# Patient Record
Sex: FEMALE | Race: WHITE
[De-identification: names, ages, dates, MRNs, and addresses within clinical notes are randomized per-mention and may not be internally consistent; named-entity substitution may affect disease eponyms.]

---

## 2019-07-22 ENCOUNTER — HOSPITAL ENCOUNTER (OUTPATIENT)
Dept: HOSPITAL 91 - E/R | Age: 79
Setting detail: OBSERVATION
LOS: 1 days | Discharge: HOME | End: 2019-07-23
Payer: COMMERCIAL

## 2019-07-22 ENCOUNTER — HOSPITAL ENCOUNTER (OUTPATIENT)
Dept: HOSPITAL 10 - E/R | Age: 79
Setting detail: OBSERVATION
LOS: 1 days | Discharge: HOME | End: 2019-07-23
Attending: INTERNAL MEDICINE | Admitting: INTERNAL MEDICINE
Payer: COMMERCIAL

## 2019-07-22 VITALS
HEIGHT: 60 IN | WEIGHT: 116.84 LBS | BODY MASS INDEX: 22.94 KG/M2 | BODY MASS INDEX: 22.94 KG/M2 | HEIGHT: 60 IN | WEIGHT: 116.84 LBS

## 2019-07-22 VITALS — DIASTOLIC BLOOD PRESSURE: 65 MMHG | SYSTOLIC BLOOD PRESSURE: 141 MMHG | RESPIRATION RATE: 18 BRPM | HEART RATE: 60 BPM

## 2019-07-22 VITALS — RESPIRATION RATE: 17 BRPM | SYSTOLIC BLOOD PRESSURE: 148 MMHG | DIASTOLIC BLOOD PRESSURE: 72 MMHG | HEART RATE: 64 BPM

## 2019-07-22 VITALS — SYSTOLIC BLOOD PRESSURE: 158 MMHG | DIASTOLIC BLOOD PRESSURE: 68 MMHG | RESPIRATION RATE: 18 BRPM | HEART RATE: 55 BPM

## 2019-07-22 VITALS — DIASTOLIC BLOOD PRESSURE: 67 MMHG | RESPIRATION RATE: 16 BRPM | SYSTOLIC BLOOD PRESSURE: 146 MMHG | HEART RATE: 56 BPM

## 2019-07-22 DIAGNOSIS — R00.2: Primary | ICD-10-CM

## 2019-07-22 DIAGNOSIS — E78.5: ICD-10-CM

## 2019-07-22 DIAGNOSIS — M81.0: ICD-10-CM

## 2019-07-22 DIAGNOSIS — I10: ICD-10-CM

## 2019-07-22 DIAGNOSIS — Z79.84: ICD-10-CM

## 2019-07-22 DIAGNOSIS — E11.9: ICD-10-CM

## 2019-07-22 LAB
ADD MAN DIFF?: NO
ANION GAP: 7 (ref 5–13)
BASOPHIL #: 0 10^3/UL (ref 0–0.1)
BASOPHILS %: 0.2 % (ref 0–2)
BLOOD UREA NITROGEN: 22 MG/DL (ref 7–20)
CALCIUM: 9.6 MG/DL (ref 8.4–10.2)
CARBON DIOXIDE: 27 MMOL/L (ref 21–31)
CHLORIDE: 108 MMOL/L (ref 97–110)
CK INDEX: 0.5
CK INDEX: 0.6
CK-MB: 0.48 NG/ML (ref 0–2.4)
CK-MB: 0.66 NG/ML (ref 0–2.4)
CREATINE KINASE: 102 IU/L (ref 23–200)
CREATINE KINASE: 115 IU/L (ref 23–200)
CREATININE: 0.95 MG/DL (ref 0.44–1)
EOSINOPHILS #: 0 10^3/UL (ref 0–0.5)
EOSINOPHILS %: 0.5 % (ref 0–7)
FREE THYROXINE INDEX (CALC): 3.07 UG/ML (ref 0.65–3.89)
GLUCOSE: 151 MG/DL (ref 70–220)
HEMATOCRIT: 37.7 % (ref 37–47)
HEMOGLOBIN: 12.3 G/DL (ref 12–16)
IMMATURE GRANS #M: 0.01 10^3/UL (ref 0–0.03)
IMMATURE GRANS % (M): 0.2 % (ref 0–0.43)
INR: 0.97
LYMPHOCYTES #: 2.1 10^3/UL (ref 0.8–2.9)
LYMPHOCYTES %: 35.8 % (ref 15–51)
MAGNESIUM: 2.1 MG/DL (ref 1.7–2.5)
MEAN CORPUSCULAR HEMOGLOBIN: 30.9 PG (ref 29–33)
MEAN CORPUSCULAR HGB CONC: 32.6 G/DL (ref 32–37)
MEAN CORPUSCULAR VOLUME: 94.7 FL (ref 82–101)
MEAN PLATELET VOLUME: 12.5 FL (ref 7.4–10.4)
MONOCYTE #: 0.5 10^3/UL (ref 0.3–0.9)
MONOCYTES %: 8.2 % (ref 0–11)
NEUTROPHIL #: 3.2 10^3/UL (ref 1.6–7.5)
NEUTROPHILS %: 55.1 % (ref 39–77)
NUCLEATED RED BLOOD CELLS #: 0 10^3/UL (ref 0–0)
NUCLEATED RED BLOOD CELLS%: 0 /100WBC (ref 0–0)
PARTIAL THROMBOPLASTIN TIME: 26.9 SEC (ref 23–35)
PLATELET COUNT: 115 10^3/UL (ref 140–415)
POTASSIUM: 4.3 MMOL/L (ref 3.5–5.1)
PROTIME: 13 SEC (ref 11.9–14.9)
PT RATIO: 1
RED BLOOD COUNT: 3.98 10^6/UL (ref 4.2–5.4)
RED CELL DISTRIBUTION WIDTH: 12 % (ref 11.5–14.5)
SODIUM: 142 MMOL/L (ref 135–144)
T3 UPTAKE: 29.5 % (ref 23.5–40.5)
T4 (THYROXINE): 10.4 UG/DL (ref 5.5–11)
THYROID STIMULATING HORMONE: 0.81 MIU/L (ref 0.47–4.68)
THYROID STIMULATING HORMONE: 0.85 MIU/L (ref 0.47–4.68)
TROPONIN-I: < 0.012 NG/ML (ref 0–0.12)
WHITE BLOOD COUNT: 5.7 10^3/UL (ref 4.8–10.8)

## 2019-07-22 PROCEDURE — 84484 ASSAY OF TROPONIN QUANT: CPT

## 2019-07-22 PROCEDURE — 93005 ELECTROCARDIOGRAM TRACING: CPT

## 2019-07-22 PROCEDURE — 93971 EXTREMITY STUDY: CPT

## 2019-07-22 PROCEDURE — 85610 PROTHROMBIN TIME: CPT

## 2019-07-22 PROCEDURE — 80048 BASIC METABOLIC PNL TOTAL CA: CPT

## 2019-07-22 PROCEDURE — 82550 ASSAY OF CK (CPK): CPT

## 2019-07-22 PROCEDURE — 83735 ASSAY OF MAGNESIUM: CPT

## 2019-07-22 PROCEDURE — 84436 ASSAY OF TOTAL THYROXINE: CPT

## 2019-07-22 PROCEDURE — 85730 THROMBOPLASTIN TIME PARTIAL: CPT

## 2019-07-22 PROCEDURE — 84479 ASSAY OF THYROID (T3 OR T4): CPT

## 2019-07-22 PROCEDURE — 82962 GLUCOSE BLOOD TEST: CPT

## 2019-07-22 PROCEDURE — 85025 COMPLETE CBC W/AUTO DIFF WBC: CPT

## 2019-07-22 PROCEDURE — 82553 CREATINE MB FRACTION: CPT

## 2019-07-22 PROCEDURE — 36415 COLL VENOUS BLD VENIPUNCTURE: CPT

## 2019-07-22 PROCEDURE — 93306 TTE W/DOPPLER COMPLETE: CPT

## 2019-07-22 PROCEDURE — 71045 X-RAY EXAM CHEST 1 VIEW: CPT

## 2019-07-22 PROCEDURE — 84443 ASSAY THYROID STIM HORMONE: CPT

## 2019-07-22 PROCEDURE — 80061 LIPID PANEL: CPT

## 2019-07-22 PROCEDURE — 99285 EMERGENCY DEPT VISIT HI MDM: CPT

## 2019-07-22 RX ADMIN — INSULIN ASPART 1 UNIT: 100 INJECTION, SOLUTION INTRAVENOUS; SUBCUTANEOUS at 21:30

## 2019-07-22 RX ADMIN — METOPROLOL TARTRATE 1 MG: 25 TABLET ORAL at 20:54

## 2019-07-22 RX ADMIN — METOPROLOL TARTRATE SCH MG: 25 TABLET, FILM COATED ORAL at 20:54

## 2019-07-22 RX ADMIN — ATORVASTATIN CALCIUM 1 MG: 10 TABLET, FILM COATED ORAL at 20:52

## 2019-07-22 RX ADMIN — INSULIN ASPART 1 UNIT: 100 INJECTION, SOLUTION INTRAVENOUS; SUBCUTANEOUS at 18:54

## 2019-07-22 NOTE — ERD
ER Documentation


Chief Complaint


Chief Complaint





sent by pmd for new onset of a-fib, asymptomatic





HPI


79-year-old female who was sent by primary care physician's office found to have


new onset atrial fibrillation versus atrial flutter.  Patient states that she 


was in a routine visit for new initiation of primary care provider.  The patient


had an EKG that showed evidence of atrial flutter.  Patient was asymptomatic at 


the time.  Patient has described some palpitations over the last several weeks. 


Patient denies any fevers chills chest pain or shortness of breath, no pleuritic


pain.  Patient is asymptomatic currently.  History provided and assisted by 


granddaughter.





ROS


All systems reviewed and are negative except as per history of present illness.





Medications


Home Meds


Reported Medications


Glipizide* (Glipizide*) 5 Mg Tablet, 1 TAB ORAL BID


   7/22/19


Alendronate Sodium* (Fosamax*) 70 Mg Tablet, 1 TAB ORAL WEEKLY


   ON SUNDAYS


   7/22/19


Losartan Potassium* (Losartan Potassium*) 100 Mg Tablet, 1 TAB ORAL DAILY


   7/22/19


Atorvastatin (Atorvastatin) 10 Mg Tablet, 1 TAB ORAL QHS


   7/22/19


Aspirin Delayed Release (Aspirin Delayed Release) 81 Mg Tablet.dr, 1 TAB ORAL 


DAILY


   7/22/19


Amlodipine Besylate* (Amlodipine Besylate*) 5 Mg Tablet, 1 TAB ORAL DAILY


   7/22/19





Allergies


Allergies:  


Coded Allergies:  


     No Known Allergy (Unverified , 7/22/19)





PMhx/Soc


Medical and Surgical Hx:  pt denies Surgical Hx


Hx Cardiac Disorders:  Yes (htn)


Hx Miscellaneous Medical Probl:  Yes (dm, glaucoma )


Hx Alcohol Use:  No


Hx Substance Use:  No


Hx Tobacco Use:  No


Smoking Status:  Never smoker





FmHx


Family History:  No diabetes





Physical Exam


Vitals





Vital Signs


  Date      Temp  Pulse  Resp  B/P (MAP)   Pulse Ox  O2          O2 Flow    FiO2


Time                                                 Delivery    Rate


   7/22/19           56    17      157/52       100  Room Air


     12:28                           (87)


   7/22/19           62    17      147/59       100  Room Air


     11:18                           (88)


   7/22/19  98.1     63    18      172/72        98


     10:38                          (105)





Physical Exam


General: Well developed, well nourished, no acute distress


Head: Normocephalic, atraumatic.


Eyes: Pupils equally reactive, EOM intact


ENT: Moist mucous membranes


Neck: Supple, no lymphadenopathy


Respiratory: Lungs clear bilaterally, no distress


Cardiovascular: RRR, no murmurs, rubs, or gallops


Abdominal: Soft, non-tender, non-distended, no peritoneal signs


: Deferred


MSK: No edema, no unilateral swelling, 5/5 strength


Neurologic: Alert and oriented, moving all extremities, normal speech, no focal 


weakness, no cerebellar signs


Skin: No rash


Psych: Normal mood


Result Diagram:  


7/22/19 1109                                                                    


           7/22/19 1109





Results 24 hrs





Laboratory Tests


              Test
                                 7/22/19
11:09


              White Blood Count                      5.7 10^3/ul


              Red Blood Count                       3.98 10^6/ul


              Hemoglobin                               12.3 g/dl


              Hematocrit                                  37.7 %


              Mean Corpuscular Volume                    94.7 fl


              Mean Corpuscular Hemoglobin                30.9 pg


              Mean Corpuscular Hemoglobin
Concent     32.6 g/dl 



              Red Cell Distribution Width                 12.0 %


              Platelet Count                         115 10^3/UL


              Mean Platelet Volume                       12.5 fl


              Immature Granulocytes %                    0.200 %


              Neutrophils %                               55.1 %


              Lymphocytes %                               35.8 %


              Monocytes %                                  8.2 %


              Eosinophils %                                0.5 %


              Basophils %                                  0.2 %


              Nucleated Red Blood Cells %            0.0 /100WBC


              Immature Granulocytes #              0.010 10^3/ul


              Neutrophils #                          3.2 10^3/ul


              Lymphocytes #                          2.1 10^3/ul


              Monocytes #                            0.5 10^3/ul


              Eosinophils #                          0.0 10^3/ul


              Basophils #                            0.0 10^3/ul


              Nucleated Red Blood Cells #            0.0 10^3/ul


              Prothrombin Time                          13.0 Sec


              Prothrombin Time Ratio                         1.0


              INR International Normalized
Ratio           0.97 



              Activated Partial
Thromboplast Time      26.9 Sec 



              Sodium Level                            142 mmol/L


              Potassium Level                         4.3 mmol/L


              Chloride Level                          108 mmol/L


              Carbon Dioxide Level                     27 mmol/L


              Anion Gap                                        7


              Blood Urea Nitrogen                       22 mg/dl


              Creatinine                              0.95 mg/dl


              Est Glomerular Filtrat Rate
mL/min    mL/min 



              Glucose Level                            151 mg/dl


              Calcium Level                            9.6 mg/dl


              Magnesium Level                          2.1 mg/dl


              Troponin I                           < 0.012 ng/ml


              Thyroid Stimulating Hormone
(TSH)     0.810 MIU/L 



              Free Thyroxine Index                    3.07 ug/ml


              Thyroxine (T4)                          10.4 ug/dl


              Triiodothyronine (T3) Uptake                29.5 %





Current Medications


 Medications
   Dose
          Sig/Maryse
       Start Time
   Status  Last


 (Trade)       Ordered        Route
 PRN     Stop Time              Admin
Dose


                              Reason                                Admin


 Ondansetron    4 mg           [facility] BRIDGE      7/22/19                



HCl
  (Zofran                 PRN
 IV
       13:30



Inj)                          NAUSEA/VOMITI  7/23/19 13:29


                              NG


                650 mg         ER BRIDGE      7/22/19                



Acetaminophen                 PRN
 PO
       13:30




  (Tylenol                   .MILD PAIN     7/23/19 13:29


Tab)                          1-3 OR TEMP








Procedures/MDM


EKG, MONITORS, & DIAGNOSTIC IMAGING:


EKG reviewed from clinic:


EKG: I reviewed and interpreted a 12-lead EKG. 


Rhythm: Irregular rhythm, limited baseline but possible flutter waves


ST Changes: No contiguous ST segment elevations


T waves: No contiguous T wave inversions


Impression: Abnormal EKG 








EKG: I reviewed and interpreted a 12-lead EKG. 


Rhythm: Normal sinus rhythm


ST Changes: No contiguous ST segment elevations


T waves: No contiguous T wave inversions


Impression: [No evidence of acute cardiac ischemia]





Chest x-ray: I reviewed and interpreted a 1 view of the chest


Mediastinum: No enlargement


Cardiac silhouette: No cardiomegaly


Airspace: Clear lung fields bilaterally without evidence of pneumothorax


Bones: No evidence of fracture








LAB INTERPRETATION:


I reviewed the laboratory testing and it shows [no evidence of acute process]





MEDICAL DECISION MAKING:


Patient presents with what appears to be new onset atrial fibrillation versus 


atrial flutter.  The patient is now spontaneously converted to normal 


asymptomatic other than describing some palpitations over the last several 


weeks.  This will be a new onset process that would warrant further 


investigation including echocardiogram, risk stratification for anticoagulation.


 Further consultation with cardiologist is necessary.  Given the patient's age 


and comorbidities, lower threshold for hospitalization.  Given the patient was 


only recently established primary care observation admission for further 


monitoring, recertification and cardiology consultation seems reasonable.





ER COURSE:


* Patient continues to be resting comfortably in a normal sinus rhythm here in 


  the emergency room setting.


* Patient's CHADS-VAsc is 5 patient stroke risk was 7.2 %/year and greater than 


  90,000 patient's a 10% risk of stroke TIA or systemic embolism.  The patient 


  is likely a good candidate for anticoagulation that can be discussed on an 


  inpatient basis.





CONSULTATION:


[None]





DISPOSITION PLAN:


Accepting care team and consultations:


I discussed the current laboratory data, diagnostic imaging and emergency care 


provided.


Admitting team: Dr Choudhury ([facility])


Admitting team indication: Insurance directed





Departure


Diagnosis:  


   Primary Impression:  


   New onset atrial flutter


   Additional Impression:  


   Palpitations


Condition:  Stable











ROSA ELI MD          Jul 22, 2019 12:58

## 2019-07-22 NOTE — QN
Documentation


Comment


Pt seen and  examined


h and p rubinaated











BECKY CALABRESE MD              Jul 22, 2019 14:31

## 2019-07-22 NOTE — HP
DATE OF ADMISSION: 2019

 

REASON FOR ADMISSION:  Abnormal EKG.

 

HISTORY OF PRESENT ILLNESS:  This is an 79-year-old female with a past medical history of diabetes, h
ypertension, hyperlipidemia, who was sent in from the PCP's office for the new onset of atrial fibril
lation versus atrial flutter.  According to the patient and the family, the patient has been feeling 
 palpitations intermittently on and off for 1 year.  Occasionally she has some swelling of the bilate
ral lower extremities.  She switched her PCP to a new provider.  She went to her PCP's office, where 
they did an EKG and was told that she had atrial flutter and was told to come to the Emergency Room f
or  further evaluation.  The patient denies any chest pain, any shortness of breath. On arrival to ED
, vital signs showed blood pressure 137/51, heart rate was 56, afebrile, respiratory rate 17.  Labs s
howed a BUN 22, creatinine 0.9.  Troponin less than 0.012.  EKG showed normal sinus rhythm and cabrera bates was admitted for further management.

 

PAST MEDICAL HISTORY:

1.  Diabetes since .

2.  Hypertension.

3.  Hyperlipidemia.

4.  Glaucoma.

5.  Osteoporosis.

 

ALLERGIES:  NONE.

 

PAST SURGICAL HISTORY:  Surgery for ingrown toenail.

 

MEDICATIONS:  Takin.  Amlodipine 5 mg.

2.  Atorvastatin 10.

3.  Losartan 100.

4.  Aspirin 81.

5.  Glipizide 5 b.i.d.

6.  Fosamax q. weekly.

 

SOCIAL HISTORY:  No history of smoking, alcohol or any drug use.  Currently lives with family.

 

FAMILY HISTORY:  Noncontributory.

 

REVIEW OF SYSTEMS:  The patient sometimes complains of swelling of lower extremities.  Denies any yamilet
st pain, any shortness of breath.  Denies any abdominal pain, nausea, vomiting, diarrhea, any headach
e, any blurry vision.  Denied any hematemesis, any melena, any blood per rectum.  Denies any focal ne
urological deficit.

 

PHYSICAL EXAMINATION:

VITAL SIGNS:  Currently, blood pressure 137/51, heart rate 76, respirations 18, saturating 100% on ro
om air.

GENERAL:  The patient is awake, alert, oriented, does not appear to be in acute distress.

HEENT:  Pupils equal, round, reactive to light.

NECK:  Supple, no JVD.

HEART:  Regular rate and rhythm.

LUNGS:  Clear to auscultate bilaterally.

ABDOMEN:  Soft, nontender, nondistended, positive normoactive bowel sounds.

EXTREMITIES:  There is 1+ edema on the right lower extremity.

 

DIAGNOSTIC DATA:  BUN 22, creatinine 0.95.  Troponin less than 0.012.

 

LABORATORY DATA:  White count 5.7, hemoglobin 12.3, platelet count 115.  INR is 0.97.  EKG at the doc
tor's office showed atrial flutter.  An EKG here showed normal sinus rhythm.

 

ASSESSMENT:  This is an 79-year-old female who presented with:

1.  New onset atrial flutter manifested by palpitations, currently here EKG is within normal limit.  
Likely paroxysmal.  Patient had CHADS-VASc score 72% per ER.

2.  Diabetes.

3.  Hypertension.

4.  Hyperlipidemia.

5.  Osteoporosis.

6.  Glaucoma.

 

PLAN:  At this period of time, the patient will be admitted to tele.  The patient will be continued o
n aspirin, statin, beta blocker.  We will also get an echo.  Cardiology will be consulted.  Discussio
n will be made around regarding anticoagulation.  Rest of the treatment will depend on the patient's 
hospitalization course.

 

 

Dictated By: BECKY CARROLL/JOSE

DD:    2019 14:37:46

DT:    2019 17:11:16

Conf#: [id number]

DID#:  [id number]

## 2019-07-23 VITALS — HEART RATE: 52 BPM | DIASTOLIC BLOOD PRESSURE: 65 MMHG | SYSTOLIC BLOOD PRESSURE: 113 MMHG

## 2019-07-23 VITALS — RESPIRATION RATE: 16 BRPM | DIASTOLIC BLOOD PRESSURE: 63 MMHG | SYSTOLIC BLOOD PRESSURE: 154 MMHG | HEART RATE: 65 BPM

## 2019-07-23 VITALS — SYSTOLIC BLOOD PRESSURE: 150 MMHG | DIASTOLIC BLOOD PRESSURE: 68 MMHG | RESPIRATION RATE: 19 BRPM | HEART RATE: 64 BPM

## 2019-07-23 VITALS — SYSTOLIC BLOOD PRESSURE: 179 MMHG | DIASTOLIC BLOOD PRESSURE: 77 MMHG | HEART RATE: 54 BPM | RESPIRATION RATE: 19 BRPM

## 2019-07-23 VITALS — HEART RATE: 39 BPM

## 2019-07-23 LAB
ANION GAP: 8 (ref 5–13)
BLOOD UREA NITROGEN: 22 MG/DL (ref 7–20)
CALCIUM: 9.2 MG/DL (ref 8.4–10.2)
CARBON DIOXIDE: 26 MMOL/L (ref 21–31)
CHLORIDE: 108 MMOL/L (ref 97–110)
CHOL/HDL RATIO: 2.2 RATIO
CHOLESTEROL: 116 MG/DL (ref 100–200)
CREATININE: 0.82 MG/DL (ref 0.44–1)
GLUCOSE: 120 MG/DL (ref 70–220)
HDL CHOLESTEROL: 51 MG/DL (ref 33–92)
LDL CHOLESTEROL,CALCULATED: 51 MG/DL
POTASSIUM: 4.3 MMOL/L (ref 3.5–5.1)
SODIUM: 142 MMOL/L (ref 135–144)
TRIGLYCERIDES: 71 MG/DL (ref 0–149)

## 2019-07-23 RX ADMIN — INSULIN ASPART 1 UNIT: 100 INJECTION, SOLUTION INTRAVENOUS; SUBCUTANEOUS at 07:55

## 2019-07-23 RX ADMIN — AMLODIPINE BESYLATE 1 MG: 5 TABLET ORAL at 09:25

## 2019-07-23 RX ADMIN — ASPIRIN 325 MG ORAL TABLET 1 MG: 325 PILL ORAL at 09:24

## 2019-07-23 RX ADMIN — LOSARTAN POTASSIUM 1 MG: 25 TABLET ORAL at 11:31

## 2019-07-23 RX ADMIN — METOPROLOL TARTRATE 1 MG: 25 TABLET ORAL at 09:25

## 2019-07-23 RX ADMIN — INSULIN ASPART 1 UNIT: 100 INJECTION, SOLUTION INTRAVENOUS; SUBCUTANEOUS at 11:34

## 2019-07-23 RX ADMIN — PANTOPRAZOLE SODIUM 1 MG: 40 TABLET, DELAYED RELEASE ORAL at 05:19

## 2019-07-23 RX ADMIN — METOPROLOL TARTRATE SCH MG: 25 TABLET, FILM COATED ORAL at 09:25

## 2019-07-23 NOTE — CONS
DATE OF ADMISSION: 07/22/2019

DATE OF CONSULTATION:  07/22/2019

 

 

 

TYPE OF CONSULTATION:  Cardiology.

 

REASON FOR CONSULTATION:  Palpitations, possible atrial fibrillation, assess for

true cardiac arrhythmia.

 

REQUESTING PHYSICIAN:  Malaika Choudhury MD

 

HISTORY OF PRESENT ILLNESS:  Ms. Ragini Dueñas is a very pleasant 

79-year-old female with a history of hypertension, dyslipidemia, diabetes 

mellitus, glaucoma, who presented to new physician's office with complaints of 

palpitations.  The patient underwent an EKG concerning for possible atrial 

fibrillation flutter and therefore was sent to the Emergency Department here at 

[facility] for further evaluation and treatment.  Upon 

arrival, temperature 98.1, blood pressure 172/73, pulse 60, respiration 18, sat 

98%.  The patient's labs are notable for white count 5.70, hemoglobin 12.3, 

platelet count 115.  Sodium 142, potassium 4.3, creatinine 0.9, BUN of 22.  

Troponin negative.  INR 0.97.  The patient underwent a chest x-ray revealing no 

focal consolidation.  Patient's electrocardiogram here now reveals sinus rhythm,

rate of 61 with normal axis, normal intervals, isolated T-wave flattening in 

aVL.  The patient denies chest pain, shortness of breath.  Does state for the 

last year she has been having intermittent palpitations.  Denies dizziness, 

syncope.

 

PAST MEDICAL HISTORY:  As above in HPI.

 

MEDICATIONS CURRENTLY IN HOSPITAL:

1.  Zofran.

2.  Tylenol p.r.n.

 

ALLERGIES:  NO KNOWN DRUG ALLERGIES.

 

SOCIAL HISTORY:  No current tobacco, ETOH or illicit drug use.

 

FAMILY HISTORY:  No history of sudden cardiac death or early CAD.

 

REVIEW OF SYSTEMS:  As above in HPI.

CONSTITUTIONAL:  No fevers, chills.

PULMONARY:  No current shortness of breath.

CARDIOVASCULAR:  No current chest pain, intermittent palpitations.

GASTROINTESTINAL:  No vomiting.

GENITOURINARY:  No hematuria.

MUSCULOSKELETAL:  Degenerative joint disease.

PSYCHIATRIC:  The patient denies depression.

NEUROLOGIC:  No documented history of CVA.

 

PHYSICAL EXAMINATION:

VITAL SIGNS:  Temperature of 98.1, blood pressure 137/51, pulse 76, respiratory 

rate 18, saturating 100%.

GENERAL:  The patient is alert, awake, no acute distress.

NECK:  JVP approximately 8 to 9 cm of water.

CHEST:  Fair air movement throughout.

HEART:  Regular rate and rhythm.  Normal S1, S2, I/VI systolic murmur.  

Nondisplaced PMI.

ABDOMEN:  Positive bowel sounds, soft.

EXTREMITIES:  No significant pitting edema, 1+ pulses, bilateral posterior 

tibial.

 

LABORATORY DATA:  Most recently from today.  No further labs for my review at 

this time.

 

IMAGING STUDIES:  As above in HPI.  No further imaging studies for my review at 

this time.

 

ECG:  As above in HPI.  No further electrocardiograms for my review at this 

time.

 

IMPRESSION:

1.  Possible paroxysmal atrial fibrillation, atrial flutter by outside hospital 

EKG upon review of this outside hospital EKG, I believe this is more consistent 

with artifact and the patient's EKG actually reveals a sinus rhythm most likely,

as you are able to see a dominant P wave in some of the leads at times, when 

baseline in not affected by artifact.  Continue to follow given the patient's 

complaints of palpitations.

2.  Palpitations by history for the last year and EKG concerning for possible  

atrial fibrillation.

3.  Continue to follow for true rhythmic disorder.

4.  Hypertension.

5.  Diabetes mellitus.

6.  Dyslipidemia.

 

RECOMMENDATIONS:

1.  At this time, I would admit patient to telemetry monitoring to follow rhythm

and rates closely.

2.  We started patient on low dose beta blocker and will follow heart rate and 

blood pressure closely and continue to assess for true rhythm disorder by 

telemetry.

3.  Check a TSH to be sure subclinical hyperthyroidism is not contributing to 

any bouts of true cardiac arrhythmia or palpitations.

4.  2D echo for this patient's ejection fraction, wall motion and major 

abnormalities, and complete the patient's rule out for myocardial infarction to 

ensure the patient's EKG abnormalities are chronic in nature and not due to any 

acute coronary syndromes.  I will check a fasting lipid panel for general risk 

stratification and adjust the patient's statin therapy as necessary.

 

Thank you for allowing me to take part in the care of this patient.  I will 

continue to follow him closely with you as further recommendations will be made 

as the patient progresses through her inpatient hospital clinical course.

 

 

Dictated By: ANDREY SANDOVAL/JOSE

DD:    07/22/2019 14:17:36

DT:    07/22/2019 16:22:45

Conf#: [id number]

DID#:  [id number]

 

JOSTIN

## 2019-07-23 NOTE — RADRPT
Echocardiogram Report

 

Patient Name: DUNIA COOK MPatient ID: [id number]

: 1940 (79y 6m)Study Date: 2019 7:44:10 AM

Gender: FAccession #: [id number]

Tech: [id number] Maynard [facility]                       Location: [facility]          

Ref.Physician: ANDREY SIBLEY            Height(Cm):            

 

BSA: Weight(Kg):

Quality: AdequateOrder Physician: ANDREY SIBLEY

Account #:

 

 

Procedures:

 

Echocardiographic Report:

Transthoracic echocardiogram with complete 2D, M-Mode, and doppler 

examination.

 

Indications:

 

Palpitations.

 

 

Measurements:

2D/M Mode                                          Doppler                                           
    

Measurement    Value    Normal Range               Measurement      Value    Normal Range            
    

LVIDd 2D       4.3      [ 3.8 - 5.2 ] cm           AV Peak Carloz      1.2      [ 100.0 - 170.0 ] cm/sec
    

LVIDs 2D       2.2      [ 2.2 - 3.5 ] cm           AV Peak PG       6.0      [ 2.0 - 9.0 ] mmHg      
    

LVPWd 2D       0.9      [ 0.6 - 0.9 ] cm           LVOT Peak Carloz    0.9      [ 70.0 - 110.0 ] cm/sec 
    

IVSd 2D        0.9      [ 0.6 - 0.9 ] cm           LVOT Peak PG     3.0      [ 2.0 - 6.0 ] mmHg      
    

AoR Diam 2D    2.5      [ 2.3 - 3.1 ] cm           MV E Peak Carloz    0.8      [ 60.0 - 130.0 ] cm/sec 
    

EDV 2D         81.7     [ 46.0 - 106.0 ] ml        MV A Peak Carloz    1.0      [ 100.0 - 120.0 ] cm/sec
    

ESV 2D         16.4     [ 14.0 - 42.0 ] ml         MV E/A           0.8      [ 0.8 - 1.5 ] ratio     
    

EF 2D          79.9     [ 54.0 - 74.0 ] percent    MV Decel Time    204      [ 104 - 258 ] msec      
    

LA Dimen 2D    2.9      [ 2.7 - 3.8 ] cm           Lat E` Carloz       0.1      [ 10.0 - 15.0 ] cm/sec  
    

                                                   Lateral E/E`     9.2      [ 1.0 - 2.0 ] ratio     
    

                                                   Med E` Carloz       0.1      cm/sec                  
    

                                                   MV E/A           0.8      [ 0.8 - 1.5 ] ratio     
    

                                                   TR Peak Carloz      2.7      [ 100.0 - 280.0 ] cm/sec
    

                                                   TR Peak PG       30.0     mmHg                    
    

                                                   RVSP             33.0     [ 10.0 - 36.0 ] mmHg    
    

                                                   RA Pressure      3.0      mmHg                    
    

 

Findings:

 

Left Ventricle:

Normal left ventricular systolic function. Normal left ventricular 

cavity size. Normal left ventricular wall thickness. Ejection fraction 

is visually estimated at 65 %. Tissue Doppler/Mitral Doppler indices are 

consistent with impaired relaxation (Stage I diastolic dysfunction).

 

Right Ventricle:

Normal right ventricular size. Normal right ventricular systolic 

function.

 

Left Atrium:

The left atrium is normal in size.

 

Right Atrium:

The right atrium is normal in size.

 

Mitral Valve:

Normal appearance and function of the mitral valve with trace 

physiologic regurgitation.

 

Aortic Valve:

No significant aortic stenosis or insufficiency. Aortic cusps appear 

mildly calcified.

 

Tricuspid Valve:

Normal appearance and function of the tricuspid valve with trace 

physiologic regurgitation. Normal right ventricular systolic pressure.

 

Pulmonic Valve:

Normal pulmonic valve appearance.

 

Pericardium:

Normal pericardium with no significant pericardial effusion.

 

Aorta:

Normal aortic root.

 

IVC:

Normal size and normal respiratory collapse consistent with normal right 

atrial pressure.

 

 

Conclusions:

 

Normal left ventricular systolic function. Normal left ventricular 

cavity size. Normal left ventricular wall thickness. Ejection fraction 

is visually estimated at 65 %. Tissue Doppler/Mitral Doppler indices are 

consistent with impaired relaxation (Stage I diastolic dysfunction).

 

Normal appearance and function of the mitral valve with trace 

physiologic regurgitation.

 

No significant aortic stenosis or insufficiency. Aortic cusps appear 

mildly calcified.

 

Normal appearance and function of the tricuspid valve with trace 

physiologic regurgitation. Normal right ventricular systolic pressure.

 

Electronically Signed By:

 

Gabriel Cosby

2019 14:26:53 PDT

## 2019-07-23 NOTE — DS
DATE OF ADMISSION: 07/22/2019

DATE OF DISCHARGE: 07/23/2019

 

HISTORY OF PRESENT ILLNESS AND HOSPITAL COURSE:  This is an 79-year-old female with past medical hist
ory of diabetes, hypertension, hyperlipidemia, was sent from PCP's office for possible new onset of a
trial fibrillation versus flutter.  According to the patient and the family, the patient had been hav
ing palpitations intermittently on and off for 1 year.  Occasionally she had some swelling of the winsome
ateral extremities.  She went to PCP's office, they did an EKG and was told she had atrial flutter, w
as told to come to the Emergency Department for further evaluation.  The patient denied any chest ping
n, any shortness of breath.  On admission, the patient had troponin, which was less than 0.012.  EKG 
showed normal sinus rhythm.  The patient was admitted to a telemetry floor.  The patient had serial t
roponins that were negative.  The patient also had a lower extremity ultrasound which was negative fo
r DVT.  Also had chest x-ray that showed no focal consolidation.  The patient was seen by cardiology 
consult with Dr. Haq.  A TSH was checked which is negative.  Patient was started on low dose beta
 blocker; however, is a little bradycardic.  The beta blocker was stopped.  According to cardiology, 
there was possibility of paroxysmal atrial fibrillation and flutter.  However, upon review of outside
 hospital EKG, it was more consistent with artifact.  EKG initially reveals a sinus rhythm with domin
ant waves, so spoke to Dr. Sunshine who was covering for Dr. Haq the next day=, ideal would be to k
eep the patient on aspirin 325 and follow with them as an outpatient and they can decide thereafter. 
 Daughters were explained about the patient diagnosis, patient was feeling better and heart rate was 
in 50s to 60s with sinus and currently stable to be discharged home per Dr. Sunshien.

 

FINAL DISCHARGE DIAGNOSES:

1.  Possible paroxysmal atrial fibrillation, or atrial flutter by outside hospital EKG.  Upon review,
 cardiology felt was consistent with artifact.  The patient's EKG actually reveals a sinus rhythm.  M
ost likely they were dominant P waves, palpitations by history by last year.

2.  Hypertension.

3.  Diabetes.

4.  Dyslipidemia.

5.  History of osteoporosis.

6.  History of glaucoma.

 

DISCHARGE CONDITION:  Stable.

 

DISCHARGE DIET:  Two-gram sodium.

 

DISCHARGE MEDICATIONS:

1.  Aspirin 325 p.o. every day.

2.  Pepcid 20 mg to be taken p.r.n.

3.  Fosamax 1 tab oral weekly.

4.  Norvasc 5 mg p.o. daily.

5.  Atorvastatin 10 mg at bedtime.

6.  Glipizide 5 b.i.d.

7.  Losartan 100 every day.

8.  Aspirin 81 mg was stopped and metoprolol was held because of some bradycardia.  Patient will foll
ow up with the PCP in 1 to 2 weeks, cardiology in 2 to 3 weeks.  The patient also had an echocardiogr
am done that showed normal left ventricular systolic function, EF of 65%.

 

 

Dictated By: BECKY CARROLL/JOSE

DD:    07/23/2019 14:59:32

DT:    07/23/2019 15:22:43

Conf#: [id number]

DID#:  [id number]

## 2019-07-23 NOTE — CONS
Consult Date/Type/Reason


Admit Date/Time


Jul 22, 2019 at 13:10


Initial Consult Date





Date/Time of Note


DATE: 7/23/19 


TIME: 09:29





Subjective


No acute events - pt in sinus now - feels better now - in good fluid status. 


Will monitor clinically now. 





ROS: No fever, no chills, no nausea, no vomiting, no diarrhea/constipation


        No recent weight changes


        No chest pain, no PND, no orthopnea - mild SOB 


        No dizziness, blurred vision


        No thirst, no heat or cold intolerance





Objective


Vitals





Vital Signs


  Date      Temp  Pulse  Resp  B/P (MAP)   Pulse Ox  O2          O2 Flow    FiO2


Time                                                 Delivery    Rate


   7/23/19  97.6     65    16      154/63        97


     07:13                           (93)


   7/22/19                                           Room Air


     23:33








Intake and Output





7/22/19 7/22/19 7/23/19





1515:00


23:00


07:00





IntakeIntake Total


400 ml





BalanceBalance


400 ml











Exam


General: WN/WD/NAD, AOx 3


HEENT: Unicetric/atraumatic/EOMI (follow commands)


NECK: JVD elevated, no thyromegaly


Lymph: no lymphadenopathy


HEART: regular with no S3, II/VI systolic murmur at apex - mild SOB 


LUNGS: Coarse sounds


ABD: soft, NT, ND, +BS


: Intact


Neuro: non focal


SKIN: chronic changes


EXT: trace edema





Results/Medications


Result Diagram:  


7/22/19 1109                                                                    


           7/23/19 0555





Results 24 hrs





Laboratory Tests


Test
                 7/22/19
11:09  7/22/19
17:03  7/22/19
17:41  7/22/19
18:48


White Blood Count             5.7


Red Blood Count             3.98  L


Hemoglobin                   12.3


Hematocrit                   37.7


Mean Corpuscular             94.7


Volume


Mean Corpuscular             30.9


Hemoglobin


Mean Corpuscular            32.6  
  
              
              



Hemoglobin
Concent


Red Cell                     12.0


Distribution Width


Platelet Count               115  L


Mean Platelet Volume        12.5  H


Immature                    0.200


Granulocytes %


Neutrophils %                55.1


Lymphocytes %                35.8


Monocytes %                   8.2


Eosinophils %                 0.5


Basophils %                   0.2


Nucleated Red Blood           0.0


Cells %


Immature                    0.010


Granulocytes #


Neutrophils #                 3.2


Lymphocytes #                 2.1


Monocytes #                   0.5


Eosinophils #                 0.0


Basophils #                   0.0


Nucleated Red Blood           0.0


Cells #


Prothrombin Time             13.0


Prothrombin Time              1.0


Ratio


INR International           0.97  
  
              
              



Normalized
Ratio


Activated                   26.9  
  
              
              



Partial
Thromboplast


Time


Sodium Level                  142


Potassium Level               4.3


Chloride Level                108


Carbon Dioxide Level           27


Anion Gap                       7


Blood Urea Nitrogen           22  H


Creatinine                   0.95


Est Glomerular          
            
              
              



Filtrat Rate
mL/min


Glucose Level                 151


Calcium Level                 9.6


Magnesium Level               2.1


Troponin I            < 0.012        < 0.012


Thyroid Stimulating        0.850  
  
              
              



Hormone
(TSH)


Free Thyroxine Index         3.07


Thyroxine (T4)               10.4


Triiodothyronine             29.5


(T3) Uptake


Creatine Kinase                              102


Creatine Kinase                              0.5


Index


Creatinine Kinase MB                        0.48


(Mass)


Bedside Glucose                                             157            179


Test
                 7/22/19
20:56  7/22/19
22:47  7/23/19
01:13  7/23/19
05:55


Bedside Glucose               206                           140


Creatine Kinase                              115


Creatine Kinase                              0.6


Index


Creatinine Kinase MB                        0.66


(Mass)


Troponin I                           < 0.012


Sodium Level                                                               142


Potassium Level                                                            4.3


Chloride Level                                                             108


Carbon Dioxide Level                                                        26


Anion Gap                                                                    8


Blood Urea Nitrogen                                                        22  H


Creatinine                                                                0.82


Est Glomerular        
              
              
                



Filtrat Rate
mL/min


Glucose Level                                                              120


Calcium Level                                                              9.2


Triglycerides Level                                                         71


Cholesterol Level                                                          116


LDL Cholesterol,                                                            51


Calculated


HDL Cholesterol                                                             51


Cholesterol/HDL                                                            2.2


Ratio


Test
                 7/23/19
08:06  
              
              



Bedside Glucose               134





Home Meds


Reported Medications


Glipizide* (Glipizide*) 5 Mg Tablet, 1 TAB ORAL BID


   7/22/19


Alendronate Sodium* (Fosamax*) 70 Mg Tablet, 1 TAB ORAL WEEKLY


   ON SUNDAYS


   7/22/19


Losartan Potassium* (Losartan Potassium*) 100 Mg Tablet, 1 TAB ORAL DAILY


   7/22/19


Atorvastatin (Atorvastatin) 10 Mg Tablet, 1 TAB ORAL QHS


   7/22/19


Aspirin Delayed Release (Aspirin Delayed Release) 81 Mg Tablet.dr, 1 TAB ORAL 


DAILY


   7/22/19


Amlodipine Besylate* (Amlodipine Besylate*) 5 Mg Tablet, 1 TAB ORAL DAILY


   7/22/19


Medications





Current Medications


Metoprolol Tartrate (Lopressor) 12.5 mg BID PO  Last administered on 7/23/19at 


09:25; Admin Dose 12.5 MG;  Start 7/22/19 at 21:00


Metoprolol Tartrate (Lopressor) 5 mg Q4H  PRN IV HR>110 Hold SBP<100;  Start 


7/22/19 at 14:30


Aspirin (Aspirin) 325 mg DAILY PO  Last administered on 7/23/19at 09:24; Admin 


Dose 325 MG;  Start 7/23/19 at 09:00


Amlodipine Besylate (Norvasc) 5 mg DAILY PO  Last administered on 7/23/19at 


09:25; Admin Dose 5 MG;  Start 7/23/19 at 09:00


Atorvastatin Calcium (Lipitor) 10 mg QHS PO  Last administered on 7/22/19at 


20:52; Admin Dose 10 MG;  Start 7/22/19 at 21:00


IV Flush (NS 3 ml) 3 ml PER PROTOCOL IV ;  Start 7/22/19 at 15:00


Ondansetron HCl (Zofran Inj) 4 mg Q6H  PRN IV NAUSEA/VOMITING;  Start 7/22/19 at


15:00


Acetaminophen (Tylenol Tab) 650 mg Q6H  PRN PO .PAIN 1-3 OR TEMP;  Start 7/22/19


at 15:00


Docusate Sodium (Colace) 100 mg Q12H  PRN PO .CONSTIPATION;  Start 7/22/19 at 


15:00


Pantoprazole (Protonix Tab) 40 mg DAILY@06 PO  Last administered on 7/23/19at 


05:19; Admin Dose 40 MG;  Start 7/23/19 at 06:00


Diagnostic Test (Pha) (Accu-Chek) 1 ea 02 XX  Last administered on 7/23/19at 


01:18; Admin Dose 1 EA;  Start 7/23/19 at 02:00


Insulin Aspart (Novolog Insulin Pen) NOVOLOG *MILD* ALGORITHM WITH MEALS  


BEDTIME SC  Last administered on 7/22/19at 21:30; Admin Dose 1 UNIT;  Start 


7/22/19 at 17:55


Miscellaneous Information 1 ea NOTE XX ;  Start 7/22/19 at 15:30


Glucose (Glutose) 15 gm Q15M  PRN PO DECREASED GLUCOSE;  Start 7/22/19 at 15:30


Glucose (Glutose) 22.5 gm Q15M  PRN PO DECREASED GLUCOSE;  Start 7/22/19 at 


15:30


Dextrose (D50w Syringe) 25 ml Q15M  PRN IV DECREASED GLUCOSE;  Start 7/22/19 at 


15:30


Dextrose (D50w Syringe) 50 ml Q15M  PRN IV DECREASED GLUCOSE;  Start 7/22/19 at 


15:30


Glucagon (Glucagen) 1 mg Q15M  PRN IM DECREASED GLUCOSE;  Start 7/22/19 at 15:30


Glucose (Glutose) 15 gm Q15M  PRN BUCCAL DECREASED GLUCOSE;  Start 7/22/19 at 


15:30





Assessment/Plan


Hospital Course (Demo Recall)


1.  Possible paroxysmal atrial fibrillation, atrial flutter by outside hospital 


EKG upon review of this outside hospital EKG, I believe this is more consistent 


with artifact and the patient's EKG actually reveals a sinus rhythm most likely,


as you are able to see a dominant P wave in some of the leads at times, when 


baseline in not affected by artifact.  Continue to follow given the patient's 


complaints of palpitations. Rate controlled - now in sinus. 


2.  Palpitations by history for the last year - sinus now, con;'t telemetry 


monitoring  for true rhythmic disorder.


4.  Hypertension - BP on high side - arr Rx now. 


5.  Diabetes mellitus - on meds - keep euglycemic. 


6.  Dyslipidemia - Rx to goal.











MADY HARVEY MD                 Jul 23, 2019 09:33

## 2019-07-23 NOTE — PDOCDIS
Discharge Instructions


DIAGNOSIS


Discharge Diagnosis


PAROXYSMAL A flutter? per cards





CONDITION


                 [id number]
Patient Condition:  [id number]
Fair








HOME CARE INSTRUCTIONS:


         [id number]
Diet Instructions:  [id number]
Low Fat /Cholesterol








ACTIVITY:


     [id number]
Activity Restrictions:  [id number]
Slowly Increase Activity


                                        Rest between Activity


                                        Avoid heavy lifting








FOLLOW UP/APPOINTMENTS


Follow-up Plan


fu PCP in 1 week


fu cards in 1-2 weeks


take pepcis with high dose ASA











BECKY CALABRESE MD              Jul 23, 2019 10:58

## 2019-07-29 NOTE — RADRPT
Vent Rate: 52 bpm

RR Interval: 1148 msec

MN Interval: 176 msec

QRS Duration: 68 msec

QT Interval: 436 msec

QTC Interval: 407 msec

P-R-T Axis: 76 - 53 - 58 degrees

 

Sinus Bradycardia

 

Electronically Signed By: Dada Martell